# Patient Record
Sex: MALE | Race: BLACK OR AFRICAN AMERICAN | NOT HISPANIC OR LATINO | URBAN - METROPOLITAN AREA
[De-identification: names, ages, dates, MRNs, and addresses within clinical notes are randomized per-mention and may not be internally consistent; named-entity substitution may affect disease eponyms.]

---

## 2021-03-05 ENCOUNTER — INPATIENT (INPATIENT)
Facility: HOSPITAL | Age: 46
LOS: 2 days | Discharge: HOME | End: 2021-03-08
Attending: INTERNAL MEDICINE | Admitting: INTERNAL MEDICINE
Payer: MEDICAID

## 2021-03-05 VITALS
RESPIRATION RATE: 18 BRPM | OXYGEN SATURATION: 97 % | HEART RATE: 126 BPM | HEIGHT: 68 IN | WEIGHT: 179.9 LBS | SYSTOLIC BLOOD PRESSURE: 181 MMHG | DIASTOLIC BLOOD PRESSURE: 108 MMHG | TEMPERATURE: 103 F

## 2021-03-05 LAB
ALBUMIN SERPL ELPH-MCNC: 3.1 G/DL — LOW (ref 3.3–5)
ALP SERPL-CCNC: 77 U/L — SIGNIFICANT CHANGE UP (ref 40–120)
ALT FLD-CCNC: 53 U/L — SIGNIFICANT CHANGE UP (ref 12–78)
ANION GAP SERPL CALC-SCNC: 7 MMOL/L — SIGNIFICANT CHANGE UP (ref 5–17)
APPEARANCE UR: CLEAR — SIGNIFICANT CHANGE UP
APTT BLD: 32.8 SEC — SIGNIFICANT CHANGE UP (ref 27.5–35.5)
AST SERPL-CCNC: 65 U/L — HIGH (ref 15–37)
BASOPHILS # BLD AUTO: 0.02 K/UL — SIGNIFICANT CHANGE UP (ref 0–0.2)
BASOPHILS NFR BLD AUTO: 0.3 % — SIGNIFICANT CHANGE UP (ref 0–2)
BILIRUB SERPL-MCNC: 0.4 MG/DL — SIGNIFICANT CHANGE UP (ref 0.2–1.2)
BILIRUB UR-MCNC: NEGATIVE — SIGNIFICANT CHANGE UP
BUN SERPL-MCNC: 14 MG/DL — SIGNIFICANT CHANGE UP (ref 7–23)
CALCIUM SERPL-MCNC: 8.5 MG/DL — SIGNIFICANT CHANGE UP (ref 8.5–10.1)
CHLORIDE SERPL-SCNC: 105 MMOL/L — SIGNIFICANT CHANGE UP (ref 96–108)
CO2 SERPL-SCNC: 24 MMOL/L — SIGNIFICANT CHANGE UP (ref 22–31)
COLOR SPEC: YELLOW — SIGNIFICANT CHANGE UP
CREAT SERPL-MCNC: 1.33 MG/DL — HIGH (ref 0.5–1.3)
DIFF PNL FLD: ABNORMAL
EOSINOPHIL # BLD AUTO: 0 K/UL — SIGNIFICANT CHANGE UP (ref 0–0.5)
EOSINOPHIL NFR BLD AUTO: 0 % — SIGNIFICANT CHANGE UP (ref 0–6)
FLUAV AG NPH QL: SIGNIFICANT CHANGE UP
FLUBV AG NPH QL: SIGNIFICANT CHANGE UP
GLUCOSE SERPL-MCNC: 133 MG/DL — HIGH (ref 70–99)
GLUCOSE UR QL: NEGATIVE MG/DL — SIGNIFICANT CHANGE UP
HCT VFR BLD CALC: 38.6 % — LOW (ref 39–50)
HGB BLD-MCNC: 13.3 G/DL — SIGNIFICANT CHANGE UP (ref 13–17)
IMM GRANULOCYTES NFR BLD AUTO: 0.3 % — SIGNIFICANT CHANGE UP (ref 0–1.5)
INR BLD: 1.41 RATIO — HIGH (ref 0.88–1.16)
KETONES UR-MCNC: NEGATIVE — SIGNIFICANT CHANGE UP
LACTATE SERPL-SCNC: 1.4 MMOL/L — SIGNIFICANT CHANGE UP (ref 0.7–2)
LEUKOCYTE ESTERASE UR-ACNC: NEGATIVE — SIGNIFICANT CHANGE UP
LYMPHOCYTES # BLD AUTO: 1.54 K/UL — SIGNIFICANT CHANGE UP (ref 1–3.3)
LYMPHOCYTES # BLD AUTO: 21.9 % — SIGNIFICANT CHANGE UP (ref 13–44)
MCHC RBC-ENTMCNC: 27.7 PG — SIGNIFICANT CHANGE UP (ref 27–34)
MCHC RBC-ENTMCNC: 34.5 GM/DL — SIGNIFICANT CHANGE UP (ref 32–36)
MCV RBC AUTO: 80.4 FL — SIGNIFICANT CHANGE UP (ref 80–100)
MONOCYTES # BLD AUTO: 0.98 K/UL — HIGH (ref 0–0.9)
MONOCYTES NFR BLD AUTO: 13.9 % — SIGNIFICANT CHANGE UP (ref 2–14)
NEUTROPHILS # BLD AUTO: 4.47 K/UL — SIGNIFICANT CHANGE UP (ref 1.8–7.4)
NEUTROPHILS NFR BLD AUTO: 63.6 % — SIGNIFICANT CHANGE UP (ref 43–77)
NITRITE UR-MCNC: NEGATIVE — SIGNIFICANT CHANGE UP
NRBC # BLD: 0 /100 WBCS — SIGNIFICANT CHANGE UP (ref 0–0)
PH UR: 7 — SIGNIFICANT CHANGE UP (ref 5–8)
PLATELET # BLD AUTO: 259 K/UL — SIGNIFICANT CHANGE UP (ref 150–400)
POTASSIUM SERPL-MCNC: 3.9 MMOL/L — SIGNIFICANT CHANGE UP (ref 3.5–5.3)
POTASSIUM SERPL-SCNC: 3.9 MMOL/L — SIGNIFICANT CHANGE UP (ref 3.5–5.3)
PROT SERPL-MCNC: 8.4 GM/DL — HIGH (ref 6–8.3)
PROT UR-MCNC: 30 MG/DL
PROTHROM AB SERPL-ACNC: 16.1 SEC — HIGH (ref 10.6–13.6)
RBC # BLD: 4.8 M/UL — SIGNIFICANT CHANGE UP (ref 4.2–5.8)
RBC # FLD: 13.3 % — SIGNIFICANT CHANGE UP (ref 10.3–14.5)
RBC CASTS # UR COMP ASSIST: ABNORMAL /HPF (ref 0–4)
SARS-COV-2 RNA SPEC QL NAA+PROBE: SIGNIFICANT CHANGE UP
SODIUM SERPL-SCNC: 136 MMOL/L — SIGNIFICANT CHANGE UP (ref 135–145)
SP GR SPEC: 1 — LOW (ref 1.01–1.02)
UROBILINOGEN FLD QL: 1 MG/DL
WBC # BLD: 7.03 K/UL — SIGNIFICANT CHANGE UP (ref 3.8–10.5)
WBC # FLD AUTO: 7.03 K/UL — SIGNIFICANT CHANGE UP (ref 3.8–10.5)

## 2021-03-05 PROCEDURE — 99223 1ST HOSP IP/OBS HIGH 75: CPT

## 2021-03-05 PROCEDURE — 93010 ELECTROCARDIOGRAM REPORT: CPT

## 2021-03-05 PROCEDURE — 71045 X-RAY EXAM CHEST 1 VIEW: CPT | Mod: 26

## 2021-03-05 PROCEDURE — 99285 EMERGENCY DEPT VISIT HI MDM: CPT

## 2021-03-05 PROCEDURE — 70450 CT HEAD/BRAIN W/O DYE: CPT | Mod: 26,MC

## 2021-03-05 PROCEDURE — 71260 CT THORAX DX C+: CPT | Mod: 26,MC

## 2021-03-05 RX ORDER — AMLODIPINE BESYLATE 2.5 MG/1
5 TABLET ORAL DAILY
Refills: 0 | Status: DISCONTINUED | OUTPATIENT
Start: 2021-03-05 | End: 2021-03-08

## 2021-03-05 RX ORDER — SODIUM CHLORIDE 9 MG/ML
1000 INJECTION INTRAMUSCULAR; INTRAVENOUS; SUBCUTANEOUS
Refills: 0 | Status: DISCONTINUED | OUTPATIENT
Start: 2021-03-05 | End: 2021-03-07

## 2021-03-05 RX ORDER — ACETAMINOPHEN 500 MG
975 TABLET ORAL ONCE
Refills: 0 | Status: COMPLETED | OUTPATIENT
Start: 2021-03-05 | End: 2021-03-05

## 2021-03-05 RX ORDER — SODIUM CHLORIDE 9 MG/ML
2500 INJECTION INTRAMUSCULAR; INTRAVENOUS; SUBCUTANEOUS ONCE
Refills: 0 | Status: COMPLETED | OUTPATIENT
Start: 2021-03-05 | End: 2021-03-05

## 2021-03-05 RX ORDER — CEFTRIAXONE 500 MG/1
1000 INJECTION, POWDER, FOR SOLUTION INTRAMUSCULAR; INTRAVENOUS EVERY 24 HOURS
Refills: 0 | Status: DISCONTINUED | OUTPATIENT
Start: 2021-03-05 | End: 2021-03-08

## 2021-03-05 RX ORDER — AMLODIPINE BESYLATE 2.5 MG/1
2.5 TABLET ORAL DAILY
Refills: 0 | Status: DISCONTINUED | OUTPATIENT
Start: 2021-03-05 | End: 2021-03-05

## 2021-03-05 RX ORDER — KETOROLAC TROMETHAMINE 30 MG/ML
15 SYRINGE (ML) INJECTION ONCE
Refills: 0 | Status: DISCONTINUED | OUTPATIENT
Start: 2021-03-05 | End: 2021-03-05

## 2021-03-05 RX ORDER — CEFTRIAXONE 500 MG/1
1000 INJECTION, POWDER, FOR SOLUTION INTRAMUSCULAR; INTRAVENOUS ONCE
Refills: 0 | Status: COMPLETED | OUTPATIENT
Start: 2021-03-05 | End: 2021-03-05

## 2021-03-05 RX ORDER — ACETAMINOPHEN 500 MG
650 TABLET ORAL EVERY 6 HOURS
Refills: 0 | Status: DISCONTINUED | OUTPATIENT
Start: 2021-03-05 | End: 2021-03-08

## 2021-03-05 RX ORDER — AZITHROMYCIN 500 MG/1
500 TABLET, FILM COATED ORAL EVERY 24 HOURS
Refills: 0 | Status: DISCONTINUED | OUTPATIENT
Start: 2021-03-05 | End: 2021-03-08

## 2021-03-05 RX ORDER — AZITHROMYCIN 500 MG/1
500 TABLET, FILM COATED ORAL ONCE
Refills: 0 | Status: COMPLETED | OUTPATIENT
Start: 2021-03-05 | End: 2021-03-05

## 2021-03-05 RX ADMIN — Medication 15 MILLIGRAM(S): at 18:29

## 2021-03-05 RX ADMIN — Medication 975 MILLIGRAM(S): at 14:49

## 2021-03-05 RX ADMIN — Medication 650 MILLIGRAM(S): at 23:04

## 2021-03-05 RX ADMIN — AZITHROMYCIN 255 MILLIGRAM(S): 500 TABLET, FILM COATED ORAL at 18:31

## 2021-03-05 RX ADMIN — CEFTRIAXONE 100 MILLIGRAM(S): 500 INJECTION, POWDER, FOR SOLUTION INTRAMUSCULAR; INTRAVENOUS at 17:59

## 2021-03-05 RX ADMIN — Medication 975 MILLIGRAM(S): at 13:49

## 2021-03-05 RX ADMIN — Medication 15 MILLIGRAM(S): at 17:59

## 2021-03-05 RX ADMIN — Medication 650 MILLIGRAM(S): at 22:04

## 2021-03-05 RX ADMIN — SODIUM CHLORIDE 2500 MILLILITER(S): 9 INJECTION INTRAMUSCULAR; INTRAVENOUS; SUBCUTANEOUS at 13:50

## 2021-03-05 RX ADMIN — SODIUM CHLORIDE 75 MILLILITER(S): 9 INJECTION INTRAMUSCULAR; INTRAVENOUS; SUBCUTANEOUS at 22:08

## 2021-03-05 NOTE — ED PROVIDER NOTE - CLINICAL SUMMARY MEDICAL DECISION MAKING FREE TEXT BOX
46yo M w/ PMH HTN BIBA d/t headache since last night. Pt w/ noted fever, tachycardia, elevated BP on ED arrival. Pt well appearing, in NAD, unremarkable physical exam. Plan: Obtain CBC, CMP, lactate, CXR, UA/C, BC, COVID, CT brain, coag panel. Give IVF, Tylenol. Re-eval. 46yo M w/ PMH HTN BIBA d/t headache since last night. Pt w/ noted fever, tachycardia, elevated BP on ED arrival. Pt well appearing, in NAD, unremarkable physical exam. Plan: Obtain CBC, CMP, lactate, CXR, UA/C, BC, COVID, CT brain, coag panel. Give IVF, Tylenol. Re-eval. Lactate, WBC WNL. CXR w/ abnormality noted, add on CT chest. COVID negative. CT brain w/o acute pathology, CT chest w/ RUL PNA. UA w/o evidence of infection. Will admit to medicine (d/w Dr Cristina) d/t sepsis 2/2 PNA. IV abx initiated in ED. Pt updated to results and plan for admission, he understands and agrees w/ this plan.

## 2021-03-05 NOTE — ED ADULT TRIAGE NOTE - CHIEF COMPLAINT QUOTE
biba from home c/o elevated bp and headache since last night non compliant with rx bp meds x 2 months denies dizziness noted with fever and tachycardia in triage denies contact with known covid + individuals

## 2021-03-05 NOTE — H&P ADULT - NSHPLABSRESULTS_GEN_ALL_CORE
LABS:                        13.3   7.03  )-----------( 259      ( 05 Mar 2021 13:39 )             38.6     03-    136  |  105  |  14  ----------------------------<  133<H>  3.9   |  24  |  1.33<H>    Ca    8.5      05 Mar 2021 13:39    TPro  8.4<H>  /  Alb  3.1<L>  /  TBili  0.4  /  DBili  x   /  AST  65<H>  /  ALT  53  /  AlkPhos  77  03-    PT/INR - ( 05 Mar 2021 13:39 )   PT: 16.1 sec;   INR: 1.41 ratio         PTT - ( 05 Mar 2021 13:39 )  PTT:32.8 sec  Urinalysis Basic - ( 05 Mar 2021 15:49 )    Color: Yellow / Appearance: Clear / S.005 / pH: x  Gluc: x / Ketone: Negative  / Bili: Negative / Urobili: 1 mg/dL   Blood: x / Protein: 30 mg/dL / Nitrite: Negative   Leuk Esterase: Negative / RBC: 25-50 /HPF / WBC x   Sq Epi: x / Non Sq Epi: x / Bacteria: x          RADIOLOGY & ADDITIONAL TESTS:

## 2021-03-05 NOTE — ED PROVIDER NOTE - PROGRESS NOTE DETAILS
Resting comfortably in bed. VSS and WNL, fever resolved w/ IVF and Tylenol. Pt reports continued headache.

## 2021-03-05 NOTE — ED PROVIDER NOTE - OBJECTIVE STATEMENT
44yo M w/ PMH HTN (non compliant w/ meds) BIBA d/t headache. Pt reports headache onset last night, initially 10/10, currently 5/10, pt described pain to top of head radiating into neck. Pt reports his wife called his son whom called 911 as pt w/ R hand shaking onset this AM. Pt denies F/C, neck stiffness, dizziness, CP, SOB, cough, abd pain, back pain, N/V/D, UTI sx. Denies recent travel, sick contacts. Per EMS, [facility] called them to pt home, reports pt w/ tachycardia to 120s, elevated /100. Giving supplemental oxygen. Pt w/ noted fever to 101F on ED arrival. Pt reports no meds taken for headache PTA.     PMH HTN, meds none, NKDA, no PSH. 44yo M w/ PMH HTN (non compliant w/ meds) BIBA d/t headache. Pt reports headache onset last night, initially 10/10, currently 5/10, pt described pain to top of head radiating into neck. Pt reports his wife called his son whom called 911 as pt w/ R hand shaking onset this AM. Pt denies F/C, neck stiffness, dizziness, CP, SOB, cough, abd pain, back pain, N/V/D, UTI sx. Denies recent travel, sick contacts. Per EMS, [facility] called them to pt home, reports pt w/ tachycardia to 120s, elevated /100. Giving supplemental oxygen. Pt w/ noted fever to 101F (104.8F rectally) on ED arrival. Pt reports no meds taken for headache PTA.     PMH HTN, meds none, NKDA, no PSH.

## 2021-03-05 NOTE — ED PROVIDER NOTE - PHYSICAL EXAMINATION
GEN: Awake, alert, interactive, NAD.  HEAD AND NECK: NC/AT. Airway patent. Neck supple. No meningismus.   EYES:  Clear b/l. EOMI. PERRL.   ENT: Moist mucus membranes.   CARDIAC: Regular rate, regular rhythm. No evident pedal edema.    RESP/CHEST: Normal respiratory effort with no use of accessory muscles or retractions. Clear throughout on auscultation.  ABD: soft, non-distended, non-tender. No rebound, no guarding.   BACK: No midline spinal TTP. No CVAT.   EXTREMITIES: Moving all extremities with no apparent deformities.   SKIN: Warm, dry, intact normal color. No rash.   NEURO: AOx3, CN II-XII grossly intact, no focal deficits.   PSYCH: Appropriate mood and affect.

## 2021-03-05 NOTE — H&P ADULT - NSHPPHYSICALEXAM_GEN_ALL_CORE
PHYSICAL EXAMINATION:  Vital Signs Last 24 Hrs  T(C): 37 (05 Mar 2021 16:13), Max: 40.4 (05 Mar 2021 13:24)  T(F): 98.6 (05 Mar 2021 16:13), Max: 104.8 (05 Mar 2021 13:24)  HR: 90 (05 Mar 2021 16:13) (90 - 126)  BP: 133/78 (05 Mar 2021 16:13) (133/78 - 181/108)  BP(mean): --  RR: 20 (05 Mar 2021 16:13) (18 - 24)  SpO2: 96% (05 Mar 2021 16:13) (96% - 97%)  CAPILLARY BLOOD GLUCOSE          GENERAL: NAD, well-groomed, well-developed  HEAD:  atraumatic, normocephalic  EYES: sclera anicteric  ENMT: mucous membranes moist  NECK: supple, No JVD  CHEST/LUNG: clear to auscultation bilaterally; no rales, rhonchi, or wheezing b/l  HEART: normal S1, S2  ABDOMEN: BS+, soft, ND, NT   EXTREMITIES:  pulses palpable; no clubbing, cyanosis, or edema b/l LEs  NEURO: awake, alert, interactive; moves all extremities  SKIN: no rashes or lesions PHYSICAL EXAMINATION:  Vital Signs Last 24 Hrs  T(C): 37 (05 Mar 2021 16:13), Max: 40.4 (05 Mar 2021 13:24)  T(F): 98.6 (05 Mar 2021 16:13), Max: 104.8 (05 Mar 2021 13:24)  HR: 90 (05 Mar 2021 16:13) (90 - 126)  BP: 133/78 (05 Mar 2021 16:13) (133/78 - 181/108)  BP(mean): --  RR: 20 (05 Mar 2021 16:13) (18 - 24)  SpO2: 96% (05 Mar 2021 16:13) (96% - 97%)  CAPILLARY BLOOD GLUCOSE          GENERAL: NAD, well-groomed, well-developed, seen in ER, comfortable with NC on  HEAD:  atraumatic, normocephalic  EYES: sclera anicteric  ENMT: mucous membranes moist  NECK: supple, No JVD  CHEST/LUNG: clear to auscultation bilaterally; no rales, rhonchi, or wheezing b/l  HEART: normal S1, S2  ABDOMEN: BS+, soft, ND, NT   EXTREMITIES:  pulses palpable; no clubbing, cyanosis, or edema b/l LEs  NEURO: awake, alert, interactive; moves all extremities  SKIN: no rashes or lesions

## 2021-03-05 NOTE — H&P ADULT - HISTORY OF PRESENT ILLNESS
44 yo Male PMH HTN (non compliant w/ meds) BIBA c/o headache. Pt reports headache onset last night, initially 10/10, currently 5/10, pt described pain to top of head radiating into neck. Pt reports his wife called his son whom called 911 as pt had right hand shaking onset this AM. Pt denies neck stiffness, dizziness, CP, SOB, cough, abd pain, back pain, N/V/D, UTI sx. Denies recent travel, sick contacts. Per EMS, [email or web address] called them to pt home, reports pt w/ tachycardia to 120s, elevated /100. Giving supplemental oxygen. Pt with noted fever to 101 in ED.

## 2021-03-05 NOTE — H&P ADULT - ASSESSMENT
44 yo Male PMH HTN (non compliant w/ meds) BIBA c/o headache. Pt reports headache onset last night, initially 10/10, currently 5/10, pt described pain to top of head radiating into neck. Pt reports his wife called his son whom called 911 as pt had right hand shaking onset this AM. Pt denies neck stiffness, dizziness, CP, SOB, cough, abd pain, back pain, N/V/D, UTI sx. Denies recent travel, sick contacts. Per EMS, [facility] called them to pt home, reports pt w/ tachycardia to 120s, elevated /100. Giving supplemental oxygen. Pt with noted fever to 101 in ED.  46 yo Male PMH HTN (non compliant w/ meds) BIBA c/o headache. Pt reports headache onset last night, initially 10/10, currently 5/10, pt described pain to top of head radiating into neck. Pt reports his wife called his son whom called 911 as pt had right hand shaking onset this AM. Pt denies neck stiffness, dizziness, CP, SOB, cough, abd pain, back pain, N/V/D, UTI sx. Denies recent travel, sick contacts. Per EMS, [facility] called them to pt home, reports pt w/ tachycardia to 120s, elevated /100. Giving supplemental oxygen. Pt with noted fever to 101 in ED.     Plan: CAP, COVID - 19 negative. IVF normal saline 75/h, lactate WNL 1.4. Will start IV Ceftriaxone and Zithromax. CT chest confirms  RUL pneumonia.     CV: Will add Norvasc 5 mg/day for HTN, not sure of BP meds, has not taken it in over two months. EKG is NSR.     Renal: Mild JOE on arrival from sepsis, follow trend after IVF in AM.       44 yo Male PMH HTN (non compliant w/ meds) BIBA c/o headache. Pt reports headache onset last night, initially 10/10, currently 5/10, pt described pain to top of head radiating into neck. Pt reports his wife called his son whom called 911 as pt had right hand shaking onset this AM. Pt denies neck stiffness, dizziness, CP, SOB, cough, abd pain, back pain, N/V/D, UTI sx. Denies recent travel, sick contacts. Per EMS, [facility] called them to pt home, reports pt w/ tachycardia to 120s, elevated /100. Giving supplemental oxygen. Pt with noted fever to 101 in ED.     Plan: CAP, COVID - 19 negative. Sepsis present on arrival from CAP. IVF normal saline 75/h, lactate WNL 1.4. Will start IV Ceftriaxone and Zithromax. CT chest confirms RUL pneumonia.     CV: Will add Norvasc 5 mg/day for HTN, not sure of BP meds, has not taken it in over two months. EKG is NSR.     Renal: Mild JOE on arrival from sepsis, follow trend after IVF in AM.       46 yo Male PMH HTN (non compliant w/ meds) BIBA c/o headache. Pt reports headache onset last night, initially 10/10, currently 5/10, pt described pain to top of head radiating into neck. Pt reports his wife called his son whom called 911 as pt had right hand shaking onset this AM. Pt denies neck stiffness, dizziness, CP, SOB, cough, abd pain, back pain, N/V/D, UTI sx. Denies recent travel, sick contacts. Per EMS, [facility] called them to pt home, reports pt w/ tachycardia to 120s, elevated /100. Giving supplemental oxygen. Pt with noted fever to 101 in ED.     ID: Fevers from CAP, COVID - 19 negative. Sepsis present on arrival from CAP. IVF normal saline 75/h, lactate WNL 1.4. Will start IV Ceftriaxone and Zithromax. CT chest confirms RUL pneumonia. UA shows hematuria with 25 RBC, no WBC. Follow urine culture in AM.      CV: Will add Norvasc 5 mg/day for HTN, not sure of BP meds, has not taken it in over two months. EKG is NSR.     Renal: Mild JOE on arrival from sepsis, follow trend after IVF in AM.

## 2021-03-06 LAB
ALBUMIN SERPL ELPH-MCNC: 2.9 G/DL — LOW (ref 3.3–5)
ALP SERPL-CCNC: 65 U/L — SIGNIFICANT CHANGE UP (ref 40–120)
ALT FLD-CCNC: 59 U/L — SIGNIFICANT CHANGE UP (ref 12–78)
ANION GAP SERPL CALC-SCNC: 6 MMOL/L — SIGNIFICANT CHANGE UP (ref 5–17)
AST SERPL-CCNC: 69 U/L — HIGH (ref 15–37)
BASOPHILS # BLD AUTO: 0.01 K/UL — SIGNIFICANT CHANGE UP (ref 0–0.2)
BASOPHILS NFR BLD AUTO: 0.2 % — SIGNIFICANT CHANGE UP (ref 0–2)
BILIRUB SERPL-MCNC: 0.3 MG/DL — SIGNIFICANT CHANGE UP (ref 0.2–1.2)
BUN SERPL-MCNC: 10 MG/DL — SIGNIFICANT CHANGE UP (ref 7–23)
CALCIUM SERPL-MCNC: 8.6 MG/DL — SIGNIFICANT CHANGE UP (ref 8.5–10.1)
CHLORIDE SERPL-SCNC: 103 MMOL/L — SIGNIFICANT CHANGE UP (ref 96–108)
CHOLEST SERPL-MCNC: 148 MG/DL — SIGNIFICANT CHANGE UP
CO2 SERPL-SCNC: 27 MMOL/L — SIGNIFICANT CHANGE UP (ref 22–31)
CREAT SERPL-MCNC: 1.02 MG/DL — SIGNIFICANT CHANGE UP (ref 0.5–1.3)
CRP SERPL-MCNC: 146 MG/L — HIGH
CULTURE RESULTS: NO GROWTH — SIGNIFICANT CHANGE UP
EOSINOPHIL # BLD AUTO: 0.01 K/UL — SIGNIFICANT CHANGE UP (ref 0–0.5)
EOSINOPHIL NFR BLD AUTO: 0.2 % — SIGNIFICANT CHANGE UP (ref 0–6)
GLUCOSE SERPL-MCNC: 93 MG/DL — SIGNIFICANT CHANGE UP (ref 70–99)
HCT VFR BLD CALC: 37.8 % — LOW (ref 39–50)
HDLC SERPL-MCNC: 30 MG/DL — LOW
HGB BLD-MCNC: 12.5 G/DL — LOW (ref 13–17)
IMM GRANULOCYTES NFR BLD AUTO: 0.3 % — SIGNIFICANT CHANGE UP (ref 0–1.5)
LIPID PNL WITH DIRECT LDL SERPL: 97 MG/DL — SIGNIFICANT CHANGE UP
LYMPHOCYTES # BLD AUTO: 1.66 K/UL — SIGNIFICANT CHANGE UP (ref 1–3.3)
LYMPHOCYTES # BLD AUTO: 26.3 % — SIGNIFICANT CHANGE UP (ref 13–44)
MAGNESIUM SERPL-MCNC: 2.3 MG/DL — SIGNIFICANT CHANGE UP (ref 1.6–2.6)
MCHC RBC-ENTMCNC: 27 PG — SIGNIFICANT CHANGE UP (ref 27–34)
MCHC RBC-ENTMCNC: 33.1 GM/DL — SIGNIFICANT CHANGE UP (ref 32–36)
MCV RBC AUTO: 81.6 FL — SIGNIFICANT CHANGE UP (ref 80–100)
MONOCYTES # BLD AUTO: 1.08 K/UL — HIGH (ref 0–0.9)
MONOCYTES NFR BLD AUTO: 17.1 % — HIGH (ref 2–14)
NEUTROPHILS # BLD AUTO: 3.52 K/UL — SIGNIFICANT CHANGE UP (ref 1.8–7.4)
NEUTROPHILS NFR BLD AUTO: 55.9 % — SIGNIFICANT CHANGE UP (ref 43–77)
NON HDL CHOLESTEROL: 118 MG/DL — SIGNIFICANT CHANGE UP
NRBC # BLD: 0 /100 WBCS — SIGNIFICANT CHANGE UP (ref 0–0)
PHOSPHATE SERPL-MCNC: 3.1 MG/DL — SIGNIFICANT CHANGE UP (ref 2.5–4.5)
PLATELET # BLD AUTO: 289 K/UL — SIGNIFICANT CHANGE UP (ref 150–400)
POTASSIUM SERPL-MCNC: 3.8 MMOL/L — SIGNIFICANT CHANGE UP (ref 3.5–5.3)
POTASSIUM SERPL-SCNC: 3.8 MMOL/L — SIGNIFICANT CHANGE UP (ref 3.5–5.3)
PROT SERPL-MCNC: 8 GM/DL — SIGNIFICANT CHANGE UP (ref 6–8.3)
RBC # BLD: 4.63 M/UL — SIGNIFICANT CHANGE UP (ref 4.2–5.8)
RBC # FLD: 13.5 % — SIGNIFICANT CHANGE UP (ref 10.3–14.5)
SARS-COV-2 IGG SERPL QL IA: NEGATIVE — SIGNIFICANT CHANGE UP
SARS-COV-2 IGM SERPL IA-ACNC: 0.08 INDEX — SIGNIFICANT CHANGE UP
SODIUM SERPL-SCNC: 136 MMOL/L — SIGNIFICANT CHANGE UP (ref 135–145)
SPECIMEN SOURCE: SIGNIFICANT CHANGE UP
TRIGL SERPL-MCNC: 101 MG/DL — SIGNIFICANT CHANGE UP
WBC # BLD: 6.3 K/UL — SIGNIFICANT CHANGE UP (ref 3.8–10.5)
WBC # FLD AUTO: 6.3 K/UL — SIGNIFICANT CHANGE UP (ref 3.8–10.5)

## 2021-03-06 PROCEDURE — 99232 SBSQ HOSP IP/OBS MODERATE 35: CPT

## 2021-03-06 RX ORDER — ENOXAPARIN SODIUM 100 MG/ML
40 INJECTION SUBCUTANEOUS DAILY
Refills: 0 | Status: DISCONTINUED | OUTPATIENT
Start: 2021-03-07 | End: 2021-03-08

## 2021-03-06 RX ADMIN — CEFTRIAXONE 100 MILLIGRAM(S): 500 INJECTION, POWDER, FOR SOLUTION INTRAMUSCULAR; INTRAVENOUS at 17:50

## 2021-03-06 RX ADMIN — AMLODIPINE BESYLATE 5 MILLIGRAM(S): 2.5 TABLET ORAL at 05:07

## 2021-03-06 RX ADMIN — AZITHROMYCIN 255 MILLIGRAM(S): 500 TABLET, FILM COATED ORAL at 17:50

## 2021-03-06 NOTE — PROGRESS NOTE ADULT - SUBJECTIVE AND OBJECTIVE BOX
Patient is a 45y old  Male who presents with a chief complaint of Fever from CAP (05 Mar 2021 18:02)      INTERVAL HPI/OVERNIGHT EVENTS:    MEDICATIONS  (STANDING):  amLODIPine   Tablet 5 milliGRAM(s) Oral daily  azithromycin  IVPB 500 milliGRAM(s) IV Intermittent every 24 hours  cefTRIAXone   IVPB 1000 milliGRAM(s) IV Intermittent every 24 hours  sodium chloride 0.9%. 1000 milliLiter(s) (75 mL/Hr) IV Continuous <Continuous>    MEDICATIONS  (PRN):  acetaminophen   Tablet .. 650 milliGRAM(s) Oral every 6 hours PRN Mild Pain (1 - 3)      Allergies    No Known Allergies    Intolerances        Vital Signs Last 24 Hrs  T(C): 36.8 (06 Mar 2021 05:00), Max: 40.4 (05 Mar 2021 13:24)  T(F): 98.2 (06 Mar 2021 05:00), Max: 104.8 (05 Mar 2021 13:24)  HR: 89 (06 Mar 2021 05:00) (84 - 126)  BP: 147/91 (06 Mar 2021 05:00) (133/78 - 181/108)  BP(mean): --  RR: 18 (06 Mar 2021 05:00) (17 - 24)  SpO2: 96% (06 Mar 2021 05:00) (96% - 98%)    PHYSICAL EXAM:  GENERAL: NAD, well-groomed, well-developed  HEAD:  Atraumatic, Normocephalic  EYES: EOMI, PERRLA, conjunctiva and sclera clear  ENMT: No tonsillar erythema, exudates, or enlargement; Moist mucous membranes, Good dentition, No lesions  NECK: Supple, No JVD, Normal thyroid  NERVOUS SYSTEM:  Alert & Oriented X3, Good concentration; Motor Strength 5/5 B/L upper and lower extremities; DTRs 2+ intact and symmetric  CHEST/LUNG: Clear to auscultation bilaterally; No rales, rhonchi, wheezing, or rubs  HEART: Regular rate and rhythm; No murmurs, rubs, or gallops  ABDOMEN: Soft, Nontender, Nondistended; Bowel sounds present  EXTREMITIES:  2+ Peripheral Pulses, No clubbing, cyanosis, or edema  LYMPH: No lymphadenopathy noted  SKIN: No rashes or lesions    LABS:                        13.3   7.03  )-----------( 259      ( 05 Mar 2021 13:39 )             38.6         136  |  105  |  14  ----------------------------<  133<H>  3.9   |  24  |  1.33<H>    Ca    8.5      05 Mar 2021 13:39    TPro  8.4<H>  /  Alb  3.1<L>  /  TBili  0.4  /  DBili  x   /  AST  65<H>  /  ALT  53  /  AlkPhos  77      PT/INR - ( 05 Mar 2021 13:39 )   PT: 16.1 sec;   INR: 1.41 ratio         PTT - ( 05 Mar 2021 13:39 )  PTT:32.8 sec  Urinalysis Basic - ( 05 Mar 2021 15:49 )    Color: Yellow / Appearance: Clear / S.005 / pH: x  Gluc: x / Ketone: Negative  / Bili: Negative / Urobili: 1 mg/dL   Blood: x / Protein: 30 mg/dL / Nitrite: Negative   Leuk Esterase: Negative / RBC: 25-50 /HPF / WBC x   Sq Epi: x / Non Sq Epi: x / Bacteria: x       RADIOLOGY & ADDITIONAL TESTS:    21 @ 07:01  -  21 @ 07:00  --------------------------------------------------------  IN:    sodium chloride 0.9%: 900 mL    Sodium Chloride 0.9% Bolus: 2500 mL  Total IN: 3400 mL    OUT:  Total OUT: 0 mL    Total NET: 3400 mL       46 yo Male w/ history of HTN (non compliant w/ meds) who p/w headache & right hand shaking. Pt was found to have BP of 180/100 & fever of 101 in ED w/ Xray showing pneumonia w/ sepsis POA. He is lying in bed in NAD.    MEDICATIONS  (STANDING):  amLODIPine   Tablet 5 milliGRAM(s) Oral daily  azithromycin  IVPB 500 milliGRAM(s) IV Intermittent every 24 hours  cefTRIAXone   IVPB 1000 milliGRAM(s) IV Intermittent every 24 hours  sodium chloride 0.9%. 1000 milliLiter(s) (75 mL/Hr) IV Continuous <Continuous>    MEDICATIONS  (PRN):  acetaminophen   Tablet .. 650 milliGRAM(s) Oral every 6 hours PRN Mild Pain (1 - 3)      Allergies    No Known Allergies    Intolerances        Vital Signs Last 24 Hrs  T(C): 36.8 (06 Mar 2021 05:00), Max: 40.4 (05 Mar 2021 13:24)  T(F): 98.2 (06 Mar 2021 05:00), Max: 104.8 (05 Mar 2021 13:24)  HR: 89 (06 Mar 2021 05:00) (84 - 126)  BP: 147/91 (06 Mar 2021 05:00) (133/78 - 181/108)  BP(mean): --  RR: 18 (06 Mar 2021 05:00) (17 - 24)  SpO2: 96% (06 Mar 2021 05:00) (96% - 98%)    PHYSICAL EXAM:  GENERAL: NAD, well-groomed, well-developed  HEAD:  Atraumatic, Normocephalic  EYES: EOMI, PERRLA,    NECK: Supple   NERVOUS SYSTEM: Alert & Oriented X3, Good concentration   CHEST/LUNG: Clear to auscultation bilaterally; No rales, rhonchi, wheezing, or rubs  HEART: Regular rate and rhythm; No murmurs, rubs, or gallops  ABDOMEN: Soft, Nontender, Nondistended; Bowel sounds present  EXTREMITIES:  No clubbing, cyanosis, or edema       LABS:                        13.3   7.03  )-----------( 259      ( 05 Mar 2021 13:39 )             38.6     03-05    136  |  105  |  14  ----------------------------<  133<H>  3.9   |  24  |  1.33<H>    Ca    8.5      05 Mar 2021 13:39    TPro  8.4<H>  /  Alb  3.1<L>  /  TBili  0.4  /  DBili  x   /  AST  65<H>  /  ALT  53  /  AlkPhos  77      PT/INR - ( 05 Mar 2021 13:39 )   PT: 16.1 sec;   INR: 1.41 ratio         PTT - ( 05 Mar 2021 13:39 )  PTT:32.8 sec  Urinalysis Basic - ( 05 Mar 2021 15:49 )    Color: Yellow / Appearance: Clear / S.005 / pH: x  Gluc: x / Ketone: Negative  / Bili: Negative / Urobili: 1 mg/dL   Blood: x / Protein: 30 mg/dL / Nitrite: Negative   Leuk Esterase: Negative / RBC: 25-50 /HPF / WBC x   Sq Epi: x / Non Sq Epi: x / Bacteria: x       RADIOLOGY & ADDITIONAL TESTS:    21 @ 07:01  -  21 @ 07:00  --------------------------------------------------------  IN:    sodium chloride 0.9%: 900 mL    Sodium Chloride 0.9% Bolus: 2500 mL  Total IN: 3400 mL    OUT:  Total OUT: 0 mL    Total NET: 3400 mL

## 2021-03-06 NOTE — PROGRESS NOTE ADULT - ASSESSMENT
46 yo Male PMH HTN (non compliant w/ meds) BIBA c/o headache. Pt reports headache onset last night, initially 10/10, currently 5/10, pt described pain to top of head radiating into neck. Pt reports his wife called his son whom called 911 as pt had right hand shaking onset this AM. Pt denies neck stiffness, dizziness, CP, SOB, cough, abd pain, back pain, N/V/D, UTI sx. Denies recent travel, sick contacts. Per EMS, [facility] called them to pt home, reports pt w/ tachycardia to 120s, elevated /100. Giving supplemental oxygen. Pt with noted fever to 101 in ED.     ID: Fevers from CAP, COVID - 19 negative. Sepsis present on arrival from CAP. IVF normal saline 75/h, lactate WNL 1.4. Will start IV Ceftriaxone and Zithromax. CT chest confirms RUL pneumonia. UA shows hematuria with 25 RBC, no WBC. Follow urine culture in AM.      CV: Will add Norvasc 5 mg/day for HTN, not sure of BP meds, has not taken it in over two months. EKG is NSR.     Renal: Mild JOE on arrival from sepsis, follow trend after IVF in AM.   46 yo Male w/ history of HTN (non compliant w/ meds) who p/w headache & right hand shaking. Pt was found to have BP of 180/100 & fever of 101 in ED.     CAP   Sepsis present on arrival from CAP. IVF normal saline 75/h, lactate WNL 1.4. Will start IV Ceftriaxone and Zithromax. CT chest confirms RUL pneumonia. UA shows hematuria with 25 RBC, no WBC. Follow urine culture in AM.      CV: Will add Norvasc 5 mg/day for HTN, not sure of BP meds, has not taken it in over two months. EKG is NSR.     HTN  - c/w Norvasc    JOE  - likely prerenal   - c/w IVF    Prophylaxis:  DVT:  GI: 44 yo Male w/ history of HTN (non compliant w/ meds) who p/w headache & right hand shaking. Pt was found to have BP of 180/100 & fever of 101 in ED w/ Xray showing pneumonia w/ sepsis POA.     CAP  - CXR & CT showed a right upper lobe paramediastinal opacity, CT also showed subcentimeter mediastinal lymph nodes & a right hilar lymph node measures 1.1 cm  - likely due to gram neg/positive/atypicals  - c/w Ceftriaxone and Zithromax     CV: Will add Norvasc 5 mg/day for HTN, not sure of BP meds, has not taken it in over two months. EKG is NSR.     HTN  - c/w Norvasc    JOE  - likely prerenal JOE vs CKD II - no baseline Cr on file   - c/w IVF    Prophylaxis:  DVT:  GI: PO diet 46 yo Male w/ history of HTN (non compliant w/ meds) who p/w headache & right hand shaking. Pt was found to have BP of 180/100 & fever of 101 in ED w/ Xray showing pneumonia w/ sepsis POA.     CAP  - CXR & CT showed a right upper lobe paramediastinal opacity, CT also showed subcentimeter mediastinal lymph nodes & a right hilar lymph node measures 1.1 cm  - likely due to gram neg/positive/atypicals  - c/w Ceftriaxone and Zithromax   - NGTD on blood cxs    HTN  - c/w Norvasc    JOE  - likely prerenal   - resolved w/ IVF    Prophylaxis:  DVT: Lovenox  GI: PO diet

## 2021-03-07 LAB
ANION GAP SERPL CALC-SCNC: 6 MMOL/L — SIGNIFICANT CHANGE UP (ref 5–17)
BUN SERPL-MCNC: 13 MG/DL — SIGNIFICANT CHANGE UP (ref 7–23)
CALCIUM SERPL-MCNC: 8.9 MG/DL — SIGNIFICANT CHANGE UP (ref 8.5–10.1)
CHLORIDE SERPL-SCNC: 106 MMOL/L — SIGNIFICANT CHANGE UP (ref 96–108)
CO2 SERPL-SCNC: 28 MMOL/L — SIGNIFICANT CHANGE UP (ref 22–31)
CREAT SERPL-MCNC: 1.02 MG/DL — SIGNIFICANT CHANGE UP (ref 0.5–1.3)
GLUCOSE SERPL-MCNC: 96 MG/DL — SIGNIFICANT CHANGE UP (ref 70–99)
HCT VFR BLD CALC: 35.6 % — LOW (ref 39–50)
HGB BLD-MCNC: 11.6 G/DL — LOW (ref 13–17)
MAGNESIUM SERPL-MCNC: 2.4 MG/DL — SIGNIFICANT CHANGE UP (ref 1.6–2.6)
MCHC RBC-ENTMCNC: 26.7 PG — LOW (ref 27–34)
MCHC RBC-ENTMCNC: 32.6 GM/DL — SIGNIFICANT CHANGE UP (ref 32–36)
MCV RBC AUTO: 82 FL — SIGNIFICANT CHANGE UP (ref 80–100)
NRBC # BLD: 0 /100 WBCS — SIGNIFICANT CHANGE UP (ref 0–0)
PHOSPHATE SERPL-MCNC: 3.6 MG/DL — SIGNIFICANT CHANGE UP (ref 2.5–4.5)
PLATELET # BLD AUTO: 282 K/UL — SIGNIFICANT CHANGE UP (ref 150–400)
POTASSIUM SERPL-MCNC: 4.2 MMOL/L — SIGNIFICANT CHANGE UP (ref 3.5–5.3)
POTASSIUM SERPL-SCNC: 4.2 MMOL/L — SIGNIFICANT CHANGE UP (ref 3.5–5.3)
PROCALCITONIN SERPL-MCNC: 0.09 NG/ML — SIGNIFICANT CHANGE UP (ref 0.02–0.1)
RBC # BLD: 4.34 M/UL — SIGNIFICANT CHANGE UP (ref 4.2–5.8)
RBC # FLD: 13.4 % — SIGNIFICANT CHANGE UP (ref 10.3–14.5)
SODIUM SERPL-SCNC: 140 MMOL/L — SIGNIFICANT CHANGE UP (ref 135–145)
WBC # BLD: 4.42 K/UL — SIGNIFICANT CHANGE UP (ref 3.8–10.5)
WBC # FLD AUTO: 4.42 K/UL — SIGNIFICANT CHANGE UP (ref 3.8–10.5)

## 2021-03-07 PROCEDURE — 99232 SBSQ HOSP IP/OBS MODERATE 35: CPT

## 2021-03-07 RX ADMIN — AZITHROMYCIN 255 MILLIGRAM(S): 500 TABLET, FILM COATED ORAL at 17:55

## 2021-03-07 RX ADMIN — SODIUM CHLORIDE 75 MILLILITER(S): 9 INJECTION INTRAMUSCULAR; INTRAVENOUS; SUBCUTANEOUS at 11:27

## 2021-03-07 RX ADMIN — CEFTRIAXONE 100 MILLIGRAM(S): 500 INJECTION, POWDER, FOR SOLUTION INTRAMUSCULAR; INTRAVENOUS at 17:55

## 2021-03-07 RX ADMIN — AMLODIPINE BESYLATE 5 MILLIGRAM(S): 2.5 TABLET ORAL at 05:28

## 2021-03-07 RX ADMIN — ENOXAPARIN SODIUM 40 MILLIGRAM(S): 100 INJECTION SUBCUTANEOUS at 11:27

## 2021-03-07 NOTE — PROGRESS NOTE ADULT - ASSESSMENT
44 yo Male w/ history of HTN (non compliant w/ meds) who p/w headache & right hand shaking. Pt was found to have BP of 180/100 & fever of 101 in ED w/ Xray showing pneumonia w/ sepsis POA.     CAP  - CXR & CT showed a right upper lobe paramediastinal opacity, CT also showed subcentimeter mediastinal lymph nodes & a right hilar lymph node measures 1.1 cm  - likely due to gram neg/positive/atypicals  - c/w Ceftriaxone and Zithromax   - NGTD on blood cxs    HTN  - c/w Norvasc    JOE  - likely prerenal   - resolved w/ IVF    Prophylaxis:  DVT: Lovenox  GI: PO diet

## 2021-03-07 NOTE — PROGRESS NOTE ADULT - SUBJECTIVE AND OBJECTIVE BOX
44 yo Male w/ history of HTN (non compliant w/ meds) who p/w headache & right hand shaking. Pt was found to have BP of 180/100 & fever of 101 in ED w/ X-ray showing pneumonia w/ sepsis POA. He is lying in bed in NAD.     MEDICATIONS  (STANDING):  amLODIPine   Tablet 5 milliGRAM(s) Oral daily  azithromycin  IVPB 500 milliGRAM(s) IV Intermittent every 24 hours  cefTRIAXone   IVPB 1000 milliGRAM(s) IV Intermittent every 24 hours  enoxaparin Injectable 40 milliGRAM(s) SubCutaneous daily  sodium chloride 0.9%. 1000 milliLiter(s) (75 mL/Hr) IV Continuous <Continuous>    MEDICATIONS  (PRN):  acetaminophen   Tablet 650 milliGRAM(s) Oral every 6 hours PRN Mild Pain (1 - 3)      Allergies    No Known Allergies    Intolerances        Vital Signs Last 24 Hrs  T(C): 36.7 (07 Mar 2021 17:14), Max: 36.7 (06 Mar 2021 23:28)  T(F): 98.1 (07 Mar 2021 17:14), Max: 98.1 (07 Mar 2021 17:14)  HR: 76 (07 Mar 2021 17:14) (70 - 97)  BP: 141/97 (07 Mar 2021 17:14) (138/87 - 145/83)  BP(mean): --  RR: 18 (07 Mar 2021 17:14) (17 - 18)  SpO2: 99% (07 Mar 2021 17:14) (97% - 99%)    PHYSICAL EXAM:  GENERAL: NAD, well-groomed, well-developed  HEAD:  Atraumatic, Normocephalic  EYES: EOMI, PERRLA,    NECK: Supple   NERVOUS SYSTEM: Alert & Oriented X3, Good concentration   CHEST/LUNG: Clear to auscultation bilaterally; No rales, rhonchi, wheezing, or rubs  HEART: Regular rate and rhythm; No murmurs, rubs, or gallops  ABDOMEN: Soft, Nontender, Nondistended; Bowel sounds present  EXTREMITIES:  No clubbing, cyanosis, or edema    LABS:                        11.6   4.42  )-----------( 282      ( 07 Mar 2021 07:41 )             35.6     03-07    140  |  106  |  13  ----------------------------<  96  4.2   |  28  |  1.02    Ca    8.9      07 Mar 2021 07:41  Phos  3.6     03-07  Mg     2.4     03-07    TPro  8.0  /  Alb  2.9<L>  /  TBili  0.3  /  DBili  x   /  AST  69<H>  /  ALT  59  /  AlkPhos  65  03-06      Culture - Urine (collected 06 Mar 2021 00:44)  Source: .Urine Clean Catch (Midstream)  Final Report (06 Mar 2021 21:31):    No growth    Culture - Blood (collected 05 Mar 2021 18:27)  Source: .Blood Blood-Peripheral  Preliminary Report (06 Mar 2021 19:02):    No growth to date.    Culture - Blood (collected 05 Mar 2021 18:27)  Source: .Blood Blood-Peripheral  Preliminary Report (06 Mar 2021 19:02):    No growth to date.      RADIOLOGY & ADDITIONAL TESTS:    03-06-21 @ 07:01  -  03-07-21 @ 07:00  --------------------------------------------------------  IN:    Oral Fluid: 550 mL  Total IN: 550 mL    OUT:  Total OUT: 0 mL    Total NET: 550 mL

## 2021-03-08 VITALS
TEMPERATURE: 98 F | HEART RATE: 81 BPM | RESPIRATION RATE: 18 BRPM | SYSTOLIC BLOOD PRESSURE: 134 MMHG | DIASTOLIC BLOOD PRESSURE: 86 MMHG | OXYGEN SATURATION: 97 %

## 2021-03-08 LAB
ANION GAP SERPL CALC-SCNC: 4 MMOL/L — LOW (ref 5–17)
BUN SERPL-MCNC: 14 MG/DL — SIGNIFICANT CHANGE UP (ref 7–23)
CALCIUM SERPL-MCNC: 9.2 MG/DL — SIGNIFICANT CHANGE UP (ref 8.5–10.1)
CHLORIDE SERPL-SCNC: 106 MMOL/L — SIGNIFICANT CHANGE UP (ref 96–108)
CO2 SERPL-SCNC: 29 MMOL/L — SIGNIFICANT CHANGE UP (ref 22–31)
CREAT SERPL-MCNC: 1.03 MG/DL — SIGNIFICANT CHANGE UP (ref 0.5–1.3)
GLUCOSE SERPL-MCNC: 84 MG/DL — SIGNIFICANT CHANGE UP (ref 70–99)
HCT VFR BLD CALC: 37.9 % — LOW (ref 39–50)
HGB BLD-MCNC: 12.1 G/DL — LOW (ref 13–17)
MAGNESIUM SERPL-MCNC: 2.6 MG/DL — SIGNIFICANT CHANGE UP (ref 1.6–2.6)
MCHC RBC-ENTMCNC: 26.4 PG — LOW (ref 27–34)
MCHC RBC-ENTMCNC: 31.9 GM/DL — LOW (ref 32–36)
MCV RBC AUTO: 82.8 FL — SIGNIFICANT CHANGE UP (ref 80–100)
NRBC # BLD: 0 /100 WBCS — SIGNIFICANT CHANGE UP (ref 0–0)
PLATELET # BLD AUTO: 373 K/UL — SIGNIFICANT CHANGE UP (ref 150–400)
POTASSIUM SERPL-MCNC: 4.1 MMOL/L — SIGNIFICANT CHANGE UP (ref 3.5–5.3)
POTASSIUM SERPL-SCNC: 4.1 MMOL/L — SIGNIFICANT CHANGE UP (ref 3.5–5.3)
RBC # BLD: 4.58 M/UL — SIGNIFICANT CHANGE UP (ref 4.2–5.8)
RBC # FLD: 13.4 % — SIGNIFICANT CHANGE UP (ref 10.3–14.5)
SODIUM SERPL-SCNC: 139 MMOL/L — SIGNIFICANT CHANGE UP (ref 135–145)
WBC # BLD: 4.09 K/UL — SIGNIFICANT CHANGE UP (ref 3.8–10.5)
WBC # FLD AUTO: 4.09 K/UL — SIGNIFICANT CHANGE UP (ref 3.8–10.5)

## 2021-03-08 PROCEDURE — 99239 HOSP IP/OBS DSCHRG MGMT >30: CPT

## 2021-03-08 RX ORDER — CEFPODOXIME PROXETIL 100 MG
1 TABLET ORAL
Qty: 10 | Refills: 0
Start: 2021-03-08 | End: 2021-03-12

## 2021-03-08 RX ORDER — AMLODIPINE BESYLATE 2.5 MG/1
1 TABLET ORAL
Qty: 30 | Refills: 0
Start: 2021-03-08 | End: 2021-04-06

## 2021-03-08 RX ADMIN — AMLODIPINE BESYLATE 5 MILLIGRAM(S): 2.5 TABLET ORAL at 05:15

## 2021-03-08 RX ADMIN — ENOXAPARIN SODIUM 40 MILLIGRAM(S): 100 INJECTION SUBCUTANEOUS at 11:24

## 2021-03-08 NOTE — DISCHARGE NOTE NURSING/CASE MANAGEMENT/SOCIAL WORK - PATIENT PORTAL LINK FT
You can access the FollowMyHealth Patient Portal offered by [facility] by registering at the following website: [email or web address]. By joining [facility]’s FollowMyHealth portal, you will also be able to view your health information using other applications (apps) compatible with our system.

## 2021-03-08 NOTE — DISCHARGE NOTE PROVIDER - NSDCCPCAREPLAN_GEN_ALL_CORE_FT
PRINCIPAL DISCHARGE DIAGNOSIS  Diagnosis: Pneumonia of right upper lobe due to infectious organism  Assessment and Plan of Treatment:

## 2021-03-08 NOTE — DISCHARGE NOTE PROVIDER - NSDCFUADDAPPT_GEN_ALL_CORE_FT
Please have your PCP reorder CT chest as your cat scan showed subcentimeter mediastinal lymph nodes & a 1.1cm right hilar lymph node

## 2021-03-08 NOTE — DISCHARGE NOTE PROVIDER - HOSPITAL COURSE
44 yo Male w/ history of HTN (non compliant w/ meds) who p/w headache & right hand shaking. Pt was found to have JOE, BP of 180/100 & fever of 101 in ED w/ X-ray showing pneumonia w/ sepsis POA. CXR & CT showed a right upper lobe paramediastinal opacity, CT also showed subcentimeter mediastinal lymph nodes & a right hilar lymph node measures 1.1 cm. Pt was put on Ceftriaxone and Zithromax. There was NGTD on blood cxs and was discharged to home on PO antibiotics. His JOE was likely prerenal & resolved w/ IVF.     Discharge time: 43 minutes

## 2021-03-10 LAB
CULTURE RESULTS: SIGNIFICANT CHANGE UP
CULTURE RESULTS: SIGNIFICANT CHANGE UP
SPECIMEN SOURCE: SIGNIFICANT CHANGE UP
SPECIMEN SOURCE: SIGNIFICANT CHANGE UP

## 2021-03-15 DIAGNOSIS — I10 ESSENTIAL (PRIMARY) HYPERTENSION: ICD-10-CM

## 2021-03-15 DIAGNOSIS — A41.9 SEPSIS, UNSPECIFIED ORGANISM: ICD-10-CM

## 2021-03-15 DIAGNOSIS — R31.9 HEMATURIA, UNSPECIFIED: ICD-10-CM

## 2021-03-15 DIAGNOSIS — R65.20 SEVERE SEPSIS WITHOUT SEPTIC SHOCK: ICD-10-CM

## 2021-03-15 DIAGNOSIS — N17.9 ACUTE KIDNEY FAILURE, UNSPECIFIED: ICD-10-CM

## 2021-03-15 DIAGNOSIS — J15.9 UNSPECIFIED BACTERIAL PNEUMONIA: ICD-10-CM

## 2021-03-15 DIAGNOSIS — Z91.19 PATIENT'S NONCOMPLIANCE WITH OTHER MEDICAL TREATMENT AND REGIMEN: ICD-10-CM
